# Patient Record
Sex: MALE | Race: WHITE | NOT HISPANIC OR LATINO | Employment: UNEMPLOYED | URBAN - NONMETROPOLITAN AREA
[De-identification: names, ages, dates, MRNs, and addresses within clinical notes are randomized per-mention and may not be internally consistent; named-entity substitution may affect disease eponyms.]

---
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
## 2024-03-25 ENCOUNTER — HOSPITAL ENCOUNTER (EMERGENCY)
Facility: HOSPITAL | Age: 19
Discharge: HOME | End: 2024-03-25
Attending: STUDENT IN AN ORGANIZED HEALTH CARE EDUCATION/TRAINING PROGRAM | Admitting: STUDENT IN AN ORGANIZED HEALTH CARE EDUCATION/TRAINING PROGRAM
Payer: MEDICAID

## 2024-03-25 VITALS
DIASTOLIC BLOOD PRESSURE: 89 MMHG | BODY MASS INDEX: 17.36 KG/M2 | TEMPERATURE: 98.2 F | HEART RATE: 78 BPM | WEIGHT: 150 LBS | SYSTOLIC BLOOD PRESSURE: 137 MMHG | OXYGEN SATURATION: 98 % | RESPIRATION RATE: 18 BRPM | HEIGHT: 78 IN

## 2024-03-25 DIAGNOSIS — J02.9 VIRAL PHARYNGITIS: Primary | ICD-10-CM

## 2024-03-25 LAB
FLUAV SUBTYP SPEC NAA+PROBE: NOT DETECTED
FLUBV RNA ISLT QL NAA+PROBE: NOT DETECTED
S PYO AG THROAT QL: NEGATIVE
SARS-COV-2 RNA RESP QL NAA+PROBE: NOT DETECTED

## 2024-03-25 PROCEDURE — 87081 CULTURE SCREEN ONLY: CPT | Performed by: STUDENT IN AN ORGANIZED HEALTH CARE EDUCATION/TRAINING PROGRAM

## 2024-03-25 PROCEDURE — 87636 SARSCOV2 & INF A&B AMP PRB: CPT

## 2024-03-25 PROCEDURE — 99283 EMERGENCY DEPT VISIT LOW MDM: CPT

## 2024-03-25 PROCEDURE — 87880 STREP A ASSAY W/OPTIC: CPT | Performed by: STUDENT IN AN ORGANIZED HEALTH CARE EDUCATION/TRAINING PROGRAM

## 2024-03-25 RX ORDER — PSEUDOEPHEDRINE HCL 30 MG
30 TABLET ORAL EVERY 4 HOURS PRN
Qty: 30 TABLET | Refills: 0 | Status: SHIPPED | OUTPATIENT
Start: 2024-03-25

## 2024-03-25 RX ORDER — FLUTICASONE PROPIONATE 50 MCG
2 SPRAY, SUSPENSION (ML) NASAL DAILY
Qty: 11.1 ML | Refills: 0 | Status: SHIPPED | OUTPATIENT
Start: 2024-03-25

## 2024-03-25 NOTE — Clinical Note
[facility] EMERGENCY DEPARTMENT  [address] [address]  Phone: [phone]    Jame Guaman was seen and treated in our emergency department on 3/25/2024.  He may return to work on 03/28/2024.         Thank you for choosing [facility].    Pb Bowles MD

## 2024-03-25 NOTE — Clinical Note
[facility] EMERGENCY DEPARTMENT  [address] [address]  Phone: [phone]    Jame Guaman was seen and treated in our emergency department on 3/25/2024.  He may return to work on 03/27/2024.         Thank you for choosing [facility].    Pb Bowles MD

## 2024-03-25 NOTE — Clinical Note
[facility] EMERGENCY DEPARTMENT  [address] [address]  Phone: [phone]    Jame Guaman was seen and treated in our emergency department on 3/25/2024.  He may return to work on 03/28/2024.         Thank you for choosing [facility].    Pb Bowles MD

## 2024-03-26 NOTE — DISCHARGE INSTRUCTIONS
You were evaluated due to sore throat.  We swabbed you for COVID and flu and strep and all were negative.  As we discussed, we still think your symptoms are due to a virus.  We have sent a prescription for Flonase and Sudafed to help with nasal congestion.  Would recommend following with primary care doctor to ensure that you improve appropriately and Tylenol ibuprofen at home for discomfort.  If you have chest pain or shortness of breath please come back to emergency department for further evaluation.  You are now stable for discharge.

## 2024-03-26 NOTE — ED PROVIDER NOTES
Subjective:  History of Present Illness:    Patient is a 18-year-old male with no significant medical history presents today with cough, congestion, sore throat.  Reports symptoms ongoing over the last week.  Denies fever.  No chest pain or shortness of breath.  Denies any abdominal pain nausea vomiting or diarrhea.  Well-appearing on exam.    Nurses Notes reviewed and agree, including vitals, allergies, social history and prior medical history.     REVIEW OF SYSTEMS: All systems reviewed and not pertinent unless noted.  Review of Systems   Constitutional:  Positive for activity change. Negative for appetite change, chills, fatigue and fever.   HENT:  Positive for congestion, postnasal drip, rhinorrhea, sinus pressure and sore throat. Negative for sneezing and trouble swallowing.    Eyes:  Negative for discharge and itching.   Respiratory:  Positive for cough. Negative for shortness of breath.    Cardiovascular:  Negative for chest pain and palpitations.   Gastrointestinal:  Negative for abdominal distention and abdominal pain.   Endocrine: Negative for cold intolerance and heat intolerance.   Genitourinary:  Negative for decreased urine volume, dysuria and urgency.   Musculoskeletal:  Negative for gait problem, neck pain and neck stiffness.   Skin:  Negative for color change and rash.   Allergic/Immunologic: Negative for immunocompromised state.   Neurological:  Negative for facial asymmetry and headaches.   Hematological:  Negative for adenopathy.   Psychiatric/Behavioral:  Negative for self-injury and suicidal ideas.        History reviewed. No pertinent past medical history.    Allergies:    Bee venom      History reviewed. No pertinent surgical history.      Social History     Socioeconomic History    Marital status: Single   Tobacco Use    Smoking status: Never    Smokeless tobacco: Never   Vaping Use    Vaping status: Never Used   Substance and Sexual Activity    Alcohol use: Never    Drug use: Never     "Sexual activity: Defer         History reviewed. No pertinent family history.    Objective  Physical Exam:  /89 (BP Location: Left arm, Patient Position: Sitting)   Pulse 78   Temp 98.2 °F (36.8 °C) (Oral)   Resp 18   Ht 198.1 cm (78\")   Wt 68 kg (150 lb)   SpO2 98%   BMI 17.33 kg/m²      Physical Exam  Constitutional:       General: He is not in acute distress.     Appearance: Normal appearance. He is normal weight. He is not ill-appearing.   HENT:      Head: Normocephalic and atraumatic.      Nose: Nose normal. Congestion and rhinorrhea present.      Mouth/Throat:      Mouth: Mucous membranes are moist.      Pharynx: Oropharynx is clear. Uvula midline. Posterior oropharyngeal erythema present. No oropharyngeal exudate or uvula swelling.   Eyes:      Extraocular Movements: Extraocular movements intact.      Conjunctiva/sclera: Conjunctivae normal.      Pupils: Pupils are equal, round, and reactive to light.   Cardiovascular:      Rate and Rhythm: Normal rate and regular rhythm.      Pulses: Normal pulses.   Pulmonary:      Effort: Pulmonary effort is normal. No respiratory distress.      Breath sounds: Normal breath sounds.   Abdominal:      General: Abdomen is flat. Bowel sounds are normal. There is no distension.      Palpations: Abdomen is soft.      Tenderness: There is no abdominal tenderness.   Musculoskeletal:         General: No swelling or tenderness. Normal range of motion.      Cervical back: Normal range of motion and neck supple. No rigidity or tenderness.   Skin:     General: Skin is warm and dry.      Capillary Refill: Capillary refill takes less than 2 seconds.   Neurological:      General: No focal deficit present.      Mental Status: He is alert and oriented to person, place, and time. Mental status is at baseline.      Cranial Nerves: No cranial nerve deficit.      Sensory: No sensory deficit.      Motor: No weakness.   Psychiatric:         Mood and Affect: Mood normal.         " Behavior: Behavior normal.         Thought Content: Thought content normal.         Judgment: Judgment normal.         Procedures    ED Course:         Lab Results (last 24 hours)       Procedure Component Value Units Date/Time    Rapid Strep A Screen - Swab, Throat [[id number]]  (Normal) Collected: 03/25/24 2014    Specimen: Swab from Throat Updated: 03/25/24 2050     Strep A Ag Negative    COVID-19 and FLU A/B PCR, 1 HR TAT - Swab, Nasopharynx [[id number]]  (Normal) Collected: 03/25/24 2014    Specimen: Swab from Nasopharynx Updated: 03/25/24 2045     COVID19 Not Detected     Influenza A PCR Not Detected     Influenza B PCR Not Detected    Narrative:      Fact sheet for providers: https://www.fda.gov/media/017415/download    Fact sheet for patients: https://www.fda.gov/media/376387/download    Test performed by PCR.    Beta Strep Culture, Throat - Swab, Throat [[id number]] Collected: 03/25/24 2014    Specimen: Swab from Throat Updated: 03/25/24 2050             No radiology results from the last 24 hrs       MDM      Initial impression of presenting illness: Sore throat, cough, congestion    DDX: includes but is not limited to: Viral URI, COVID-19, bacterial pneumonia, strep pharyngitis, viral pharyngitis, PTA    Patient arrives stable with vitals interpreted by myself.     Pertinent features from physical exam: Clear to auscultation, regular rate rhythm, no murmur, nontender abdominal palpation, uvula midline with no unilateral swelling, no concern for PTA.    Initial diagnostic plan: COVID swab, strep swab    Results from initial plan were reviewed and interpreted by me revealing strep swab and COVID swab both negative    Diagnostic information from other sources: Discussed with patient's significant other at bedside reviewed past medical records    Interventions / Re-evaluation: Observed in emergency department for over an hour and a half no change in vital signs    Results/clinical rationale were discussed with  patient at bedside    Consultations/Discussion of results with other physicians: Discussed negative workup in our emergency department, no concern for PTA, suspect viral illness is etiology of patient's symptoms.  Given upper congestion given prescription for Sudafed, Flonase and encourage patient to follow-up with his primary care doctor to ensure resolution of symptoms.    Disposition plan: Discharge  -----        Final diagnoses:   Viral pharyngitis          Pb Bowles MD  03/25/24 0432

## 2024-03-28 LAB — BACTERIA SPEC AEROBE CULT: NORMAL

## 2024-04-10 ENCOUNTER — APPOINTMENT (OUTPATIENT)
Dept: GENERAL RADIOLOGY | Facility: HOSPITAL | Age: 19
End: 2024-04-10
Payer: MEDICAID

## 2024-04-10 ENCOUNTER — HOSPITAL ENCOUNTER (EMERGENCY)
Facility: HOSPITAL | Age: 19
Discharge: HOME | End: 2024-04-10
Attending: STUDENT IN AN ORGANIZED HEALTH CARE EDUCATION/TRAINING PROGRAM | Admitting: STUDENT IN AN ORGANIZED HEALTH CARE EDUCATION/TRAINING PROGRAM
Payer: MEDICAID

## 2024-04-10 VITALS
TEMPERATURE: 98.3 F | WEIGHT: 151.4 LBS | BODY MASS INDEX: 17.52 KG/M2 | DIASTOLIC BLOOD PRESSURE: 73 MMHG | RESPIRATION RATE: 18 BRPM | OXYGEN SATURATION: 98 % | SYSTOLIC BLOOD PRESSURE: 122 MMHG | HEART RATE: 81 BPM | HEIGHT: 78 IN

## 2024-04-10 DIAGNOSIS — M25.571 ACUTE RIGHT ANKLE PAIN: Primary | ICD-10-CM

## 2024-04-10 PROCEDURE — 99283 EMERGENCY DEPT VISIT LOW MDM: CPT

## 2024-04-10 PROCEDURE — 73610 X-RAY EXAM OF ANKLE: CPT

## 2024-04-10 RX ORDER — IBUPROFEN 800 MG/1
800 TABLET ORAL EVERY 8 HOURS PRN
Qty: 15 TABLET | Refills: 0 | Status: SHIPPED | OUTPATIENT
Start: 2024-04-10

## 2024-04-10 RX ORDER — IBUPROFEN 800 MG/1
800 TABLET ORAL ONCE
Status: COMPLETED | OUTPATIENT
Start: 2024-04-10 | End: 2024-04-10

## 2024-04-10 RX ADMIN — IBUPROFEN 800 MG: 800 TABLET, FILM COATED ORAL at 22:25

## 2024-04-10 NOTE — Clinical Note
[facility] EMERGENCY DEPARTMENT  [address] [address]  Phone: [phone]    Jame Guaman was seen and treated in our emergency department on 4/10/2024.  He may return to work on 04/12/2024.         Thank you for choosing [facility].    Dada Raymundo DO

## 2024-04-10 NOTE — Clinical Note
[facility] EMERGENCY DEPARTMENT  [address] [address]  Phone: [phone]    Jame Guaman was seen and treated in our emergency department on 4/10/2024.  He may return to work on 04/12/2024.         Thank you for choosing [facility].    Dada Raymundo DO

## 2024-04-11 NOTE — ED PROVIDER NOTES
EMERGENCY DEPARTMENT ENCOUNTER    Pt Name: Jame Guaman  MRN: [id number]  Pt :   2005  Room Number:  17  Date of encounter:  4/10/2024  PCP: Mariana Cannon MD  ED Provider: Dada Raymundo DO      HPI:  Chief Complaint: Ankle pain    Context: Jame Guaman is a 18 y.o. male with no stated past medical history who presents to the ED with right ankle pain.  Gradual onset of symptoms today.  Pain is located over the right anterior ankle.  Duration constant.  Worse with dorsiflexion of the foot.  No overlying skin changes or swelling.  Denies any obvious traumatic injuries.  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  No other associated symptoms including fever, chills, chest pain or shortness of breath, numbness or weakness of the extremities.    PAST MEDICAL HISTORY  History reviewed. No pertinent past medical history.  Current Outpatient Medications   Medication Instructions    fluticasone (FLONASE) 50 MCG/ACT nasal spray 2 sprays, Nasal, Daily    ibuprofen (ADVIL,MOTRIN) 800 mg, Oral, Every 8 Hours PRN    ondansetron ODT (ZOFRAN-ODT) 4 mg, Translingual, Every 6 Hours PRN    promethazine-dextromethorphan (PROMETHAZINE-DM) 6.25-15 MG/5ML syrup 5 mL, Oral, Nightly PRN    pseudoephedrine (SUDAFED) 30 mg, Oral, Every 4 Hours PRN        PAST SURGICAL HISTORY  History reviewed. No pertinent surgical history.    FAMILY HISTORY  History reviewed. No pertinent family history.    SOCIAL HISTORY  Social History     Socioeconomic History    Marital status: Single   Tobacco Use    Smoking status: Never    Smokeless tobacco: Never   Vaping Use    Vaping status: Never Used   Substance and Sexual Activity    Alcohol use: Never    Drug use: Never    Sexual activity: Defer       ALLERGIES  Bee venom    REVIEW OF SYSTEMS  All systems reviewed and negative except for those discussed in HPI.     PHYSICAL EXAM  ED Triage Vitals [04/10/24 2155]   Temp Heart Rate Resp BP SpO2   98.3 °F (36.8 °C) 72 18 128/73 100 %       Temp src Heart Rate Source Patient Position BP Location FiO2 (%)   Oral Monitor Sitting Left arm --     I have reviewed the triage vital signs and nursing notes.    General: Alert.  Nontoxic appearance.  No acute distress.  Head: Normocephalic.  Atraumatic.  Eyes: No scleral icterus.  Cardiovascular: Regular rate and rhythm.  No murmurs.  No rubs.  2+ DP/PT pulses bilaterally.  Normal capillary refill.  Respiratory: Equal breath sounds bilaterally.  No rales.  No rhonchi.  No wheezing.  GI: Abdomen is nondistended.  Neurologic: Oriented x 3.  No focal deficits.  Skin: No erythema.  No edema.  No pallor.  No cyanosis.  MSK: + tenderness to palpation right anterior distal tibia.  No bony deformity.  No overlying skin changes.  Full range of motion of the right knee and ankle joints.  No tenderness to the medial lateral malleolus.  No tenderness to the midfoot.  No calf tenderness.    LAB RESULTS  No results found for this or any previous visit (from the past 24 hour(s)).    RADIOLOGY  No Radiology Exams Resulted Within Past 24 Hours    PROCEDURES  Procedures    MEDICATIONS GIVEN IN ER  Medications   ibuprofen (ADVIL,MOTRIN) tablet 800 mg (800 mg Oral Given 4/10/24 2225)       MEDICAL DECISION MAKING  18 y.o. male with past medical history listed above who presents with right ankle pain. Vital signs within normal limits. Based on clinical presentation and physical exam, differential diagnosis includes, but is not limited to, contusion, sprain, tendinitis, shinsplints, doubt underlying onset normality.  No clinical evidence of arterial etiology, DVT, infection, compartment syndrome.    Please see ED course below for my interpretation of the ED workup.  ED Course as of 04/10/24 2231   Wed Apr 10, 2024   2214 XR Ankle 3+ View Right  I have independently reviewed and interpreted the x-ray right ankle.  My interpretation is negative for fracture or dislocation.   [JS]      ED Course User Index  [JS] Dada Raymundo DO       On re-evaluation, patient resting comfortably.  States symptoms have improved following therapy. Vital signs remained stable on room air.  Patient was ambulatory in the ED with steady gait.    I discussed the findings of the ED workup with the patient at bedside.  No clinical indication for admission.  I recommended outpatient follow-up with PCP/orthopedics.  Rx RICE therapy. patient was deemed medically stable for discharge with close outpatient follow-up and strict ED return precautions. Patient agreeable with plan and disposition.    Home medications were reviewed.  Prescriptions for discharge: Ibuprofen    Chronic conditions affecting care: None    Social determinants of health impacting treatment or disposition: None    REPEAT VITAL SIGNS  AS OF 22:31 EDT VITALS:  BP - 128/73  HR - 72  TEMP - 98.3 °F (36.8 °C) (Oral)  O2 SATS - 100%    DIAGNOSIS  Final diagnoses:   Acute right ankle pain       DISPOSITION  ED Disposition       ED Disposition   Discharge    Condition   Stable    Comment   --                     Please note that portions of this document were completed with voice recognition software.        Dada Raymundo DO  04/12/24 9973

## 2024-06-05 ENCOUNTER — APPOINTMENT (OUTPATIENT)
Dept: GENERAL RADIOLOGY | Facility: HOSPITAL | Age: 19
End: 2024-06-05
Payer: MEDICAID

## 2024-06-05 ENCOUNTER — HOSPITAL ENCOUNTER (EMERGENCY)
Facility: HOSPITAL | Age: 19
Discharge: HOME | End: 2024-06-05
Attending: EMERGENCY MEDICINE | Admitting: EMERGENCY MEDICINE
Payer: MEDICAID

## 2024-06-05 VITALS
HEIGHT: 78 IN | OXYGEN SATURATION: 100 % | RESPIRATION RATE: 20 BRPM | HEART RATE: 71 BPM | SYSTOLIC BLOOD PRESSURE: 119 MMHG | DIASTOLIC BLOOD PRESSURE: 76 MMHG | BODY MASS INDEX: 17.36 KG/M2 | WEIGHT: 150 LBS | TEMPERATURE: 98 F

## 2024-06-05 DIAGNOSIS — M25.521 RIGHT ELBOW PAIN: Primary | ICD-10-CM

## 2024-06-05 PROCEDURE — 63710000001 ONDANSETRON ODT 4 MG TABLET DISPERSIBLE: Performed by: EMERGENCY MEDICINE

## 2024-06-05 PROCEDURE — 99283 EMERGENCY DEPT VISIT LOW MDM: CPT

## 2024-06-05 PROCEDURE — 73080 X-RAY EXAM OF ELBOW: CPT

## 2024-06-05 PROCEDURE — 73090 X-RAY EXAM OF FOREARM: CPT

## 2024-06-05 RX ORDER — HYDROCODONE BITARTRATE AND ACETAMINOPHEN 5; 325 MG/1; MG/1
1 TABLET ORAL ONCE
Status: COMPLETED | OUTPATIENT
Start: 2024-06-05 | End: 2024-06-05

## 2024-06-05 RX ORDER — ONDANSETRON 4 MG/1
4 TABLET, ORALLY DISINTEGRATING ORAL ONCE
Status: COMPLETED | OUTPATIENT
Start: 2024-06-05 | End: 2024-06-05

## 2024-06-05 RX ORDER — METHOCARBAMOL 750 MG/1
1500 TABLET, FILM COATED ORAL ONCE
Status: COMPLETED | OUTPATIENT
Start: 2024-06-05 | End: 2024-06-05

## 2024-06-05 RX ORDER — PREDNISONE 50 MG/1
50 TABLET ORAL DAILY
Qty: 5 TABLET | Refills: 0 | Status: SHIPPED | OUTPATIENT
Start: 2024-06-05 | End: 2024-06-10

## 2024-06-05 RX ORDER — MELOXICAM 7.5 MG/1
7.5 TABLET ORAL DAILY
Qty: 7 TABLET | Refills: 0 | Status: SHIPPED | OUTPATIENT
Start: 2024-06-05 | End: 2024-06-12

## 2024-06-05 RX ORDER — METHOCARBAMOL 500 MG/1
500 TABLET, FILM COATED ORAL 4 TIMES DAILY
Qty: 12 TABLET | Refills: 0 | Status: SHIPPED | OUTPATIENT
Start: 2024-06-05 | End: 2024-06-08

## 2024-06-05 RX ADMIN — ONDANSETRON 4 MG: 4 TABLET, ORALLY DISINTEGRATING ORAL at 00:58

## 2024-06-05 RX ADMIN — METHOCARBAMOL 1500 MG: 750 TABLET, FILM COATED ORAL at 00:58

## 2024-06-05 RX ADMIN — HYDROCODONE BITARTRATE AND ACETAMINOPHEN 1 TABLET: 5; 325 TABLET ORAL at 00:59

## 2024-06-05 NOTE — ED PROVIDER NOTES
EMERGENCY DEPARTMENT ENCOUNTER    Pt Name: Jame Guaman  MRN: [id number]  Pt :   2005  Room Number:  23SF/23  Date of encounter:  2024  PCP: Mariana Cannon MD  ED Provider: Darwin Fair PA-C    Historian: Patient, significant other at bedside, nursing notes      HPI:  Chief Complaint: Right elbow pain        Context: Jame Guaman is a 18 y.o. male who presents to the ED c/o right elbow pain for the last 2 days.  Patient denies traumatic injury to the right elbow although he states he is physically demanding work which may have caused the pain.  Patient reports pain extending from his mid humerus to his mid forearm of the right upper extremity.  He denies any numbness or tingling, shoulder pain, neck pain, or any other complaint today      PAST MEDICAL HISTORY  History reviewed. No pertinent past medical history.      PAST SURGICAL HISTORY  History reviewed. No pertinent surgical history.      FAMILY HISTORY  History reviewed. No pertinent family history.      SOCIAL HISTORY  Social History     Socioeconomic History    Marital status: Single   Tobacco Use    Smoking status: Never    Smokeless tobacco: Never   Vaping Use    Vaping status: Never Used   Substance and Sexual Activity    Alcohol use: Never    Drug use: Never    Sexual activity: Defer         ALLERGIES  Bee venom        REVIEW OF SYSTEMS  Review of Systems   Constitutional:  Negative for chills and fever.   HENT:  Negative for congestion and sore throat.    Respiratory:  Negative for cough and shortness of breath.    Cardiovascular:  Negative for chest pain.   Gastrointestinal:  Negative for abdominal pain, nausea and vomiting.   Genitourinary:  Negative for dysuria.   Musculoskeletal:  Negative for back pain.        Right elbow pain   Skin:  Negative for wound.   Neurological:  Negative for dizziness and headaches.   Psychiatric/Behavioral:  Negative for confusion.    All other systems reviewed and are negative.         All  systems reviewed and negative except for those discussed in HPI.       PHYSICAL EXAM    I have reviewed the triage vital signs and nursing notes.    ED Triage Vitals [06/05/24 0017]   Temp Heart Rate Resp BP SpO2   97.9 °F (36.6 °C) 70 18 127/68 99 %      Temp src Heart Rate Source Patient Position BP Location FiO2 (%)   Oral Monitor Sitting Left arm --       Physical Exam        LAB RESULTS  No results found for this or any previous visit (from the past 24 hour(s)).    If labs were ordered, I independently reviewed the results and considered them in treating the patient.        RADIOLOGY  No Radiology Exams Resulted Within Past 24 Hours    I ordered and independently reviewed the above noted radiographic studies.      I viewed images of right elbow x-ray and right forearm x-ray which showed no acute bony abnormality per my independent interpretation.    See radiologist's dictation for official interpretation.        PROCEDURES    Procedures    No orders to display       MEDICATIONS GIVEN IN ER    Medications   HYDROcodone-acetaminophen (NORCO) 5-325 MG per tablet 1 tablet (1 tablet Oral Given 6/5/24 0059)   ondansetron ODT (ZOFRAN-ODT) disintegrating tablet 4 mg (4 mg Oral Given 6/5/24 0058)   methocarbamol (ROBAXIN) tablet 1,500 mg (1,500 mg Oral Given 6/5/24 0058)         MEDICAL DECISION MAKING, PROGRESS, and CONSULTS    All labs, if obtained, have been independently reviewed by me.  All radiology studies, if obtained, have been reviewed by me and the radiologist dictating the report.  All EKG's, if obtained, have been independently viewed and interpreted by me/my attending physician.      Discussion below represents my analysis of pertinent findings related to patient's condition, differential diagnosis, treatment plan and final disposition.    18-year-old male presented to the ER for evaluation of right elbow pain no trauma and history.  On exam there is no erythema warmth or obvious effusion concerning for  any septic arthritis or other infectious etiology.  Right upper extremity is neurovascularly intact he has full active and passive range of motion but does complain of pain with extension of the right elbow.  X-ray images of the right elbow and forearm were ordered and per my own independent interpretation there were no acute bony abnormalities.  Patient given Norco and Zofran in the ER for analgesia and on reevaluation he is still experiencing some pain.  Will provide patient with a sling as well as anti-inflammatory prescriptions and outpatient urgent orthopedic surgery referral was provided.  Recommendations to follow-up with Ortho soon as possible for further eval were given and patient verbalized understanding of and agreement with the plan.  Advised return to the ER immediately for development of fever, redness or swelling around the elbow, worsening pain with extension or flexion, and for any other concerns                     Differential diagnosis:    Differential diagnosis included but was not limited to fracture, dislocation, ligamentous injury, tendinitis, overuse injury    Additional sources:    - Discussed/ obtained information from independent historians:  significant other at bedside in addition to patient    - External (non-ED) record review:  None     - Chronic or social conditions impacting care:  none    Orders placed during this visit:  Orders Placed This Encounter   Procedures    XR Elbow 3+ View Right    XR Forearm 2 View Right    Ambulatory Referral to Orthopedic Surgery (All except Pad)    Obtain & Apply The Following- Upper extremity; Sling         Additional orders considered but not ordered: I did consider ordering labs such as inflammatory markers CRP and sed rate although there is no warmth of the skin no swelling or obvious effusion on exam to indicate septic arthritis versus other inflammatory etiology      ED Course:    Consultants: ED attending Dr. Marsh                Shared  Decision Making:  After my consideration of clinical presentation and any laboratory/radiology studies obtained, I discussed the findings with the patient/patient representative who is in agreement with the treatment plan and the final disposition.   Risks and benefits of discharge and/or observation/admission were discussed.       AS OF 01:50 EDT VITALS:    BP - 127/68  HR - 70  TEMP - 97.9 °F (36.6 °C) (Oral)  O2 SATS - 99%                  DIAGNOSIS  Final diagnoses:   Right elbow pain         DISPOSITION  Discharge      Please note that portions of this document were completed with voice recognition software.      Darwin Fair PA-C  06/05/24 0150

## 2024-06-05 NOTE — Clinical Note
[facility] EMERGENCY DEPARTMENT  [address] [address]  Phone: [phone]    Jame Guaman was seen and treated in our emergency department on 6/5/2024.  He may return to work on 06/07/2024.         Thank you for choosing [facility].    Darwin Fair PA-C

## 2024-06-05 NOTE — DISCHARGE INSTRUCTIONS
"Wear your sling as tolerated although remove  your arm from the sling multiple times per day to rotate your shoulder to prevent \"frozen shoulder\".    Do not drive or operate machinery while taking Robaxin muscle relaxer.  Do not combine your meloxicam prescription with any ibuprofen, Aleve, or other NSAID medications    Please follow-up with orthopedic surgery provider soon as possible for further evaluation of your right elbow pain.  "

## 2025-08-23 ENCOUNTER — HOSPITAL ENCOUNTER (EMERGENCY)
Facility: HOSPITAL | Age: 20
Discharge: HOME | End: 2025-08-23
Attending: EMERGENCY MEDICINE
Payer: MEDICAID